# Patient Record
(demographics unavailable — no encounter records)

---

## 2024-12-18 NOTE — HISTORY OF PRESENT ILLNESS
[FreeTextEntry1] : exercises 2-3x/week-peloton- no cp/sob due for left hernia repair and found to have RBBB on preop exam

## 2024-12-18 NOTE — DISCUSSION/SUMMARY
[FreeTextEntry1] : EKG:NSR,RBBB would get TTE for new RBBB and stress test( had negative CCTA < 2 years ago)  reviewed labs- ldl slightly high( 11mg%)- advised diet for HLD has new RBBB and is totally w/o sx His cardiac status is stable enough to permit hernia repair

## 2024-12-18 NOTE — PHYSICAL EXAM
[Well Developed] : well developed [Well Nourished] : well nourished [No Acute Distress] : no acute distress [Normal Conjunctiva] : normal conjunctiva [Normal Venous Pressure] : normal venous pressure [No Carotid Bruit] : no carotid bruit [Normal S1, S2] : normal S1, S2 [No Murmur] : no murmur [Clear Lung Fields] : clear lung fields [Good Air Entry] : good air entry [No Respiratory Distress] : no respiratory distress  [Soft] : abdomen soft [Non Tender] : non-tender [Normal Bowel Sounds] : normal bowel sounds [Normal Gait] : normal gait [No Edema] : no edema [No Rash] : no rash [Moves all extremities] : moves all extremities [No Focal Deficits] : no focal deficits [Normal Speech] : normal speech [Alert and Oriented] : alert and oriented [Normal memory] : normal memory

## 2025-03-27 NOTE — DISCUSSION/SUMMARY
[FreeTextEntry1] : EKG:NSR,RBBB Using a [employer] protocol he exercised for 12min- 1-2mm horizontal/down sloping ST depressions seen in V1-V3- given baseline RBBB feel of questionable significance but would get either CCTA or inaging stress if patient doesn't want radiation exposure to further evaluate diet and check lipids for HLD(last LDL was 111mg%)